# Patient Record
Sex: FEMALE | Race: WHITE | NOT HISPANIC OR LATINO
[De-identification: names, ages, dates, MRNs, and addresses within clinical notes are randomized per-mention and may not be internally consistent; named-entity substitution may affect disease eponyms.]

---

## 2020-06-04 ENCOUNTER — APPOINTMENT (OUTPATIENT)
Dept: SPINE | Facility: CLINIC | Age: 55
End: 2020-06-04
Payer: MEDICAID

## 2020-06-04 VITALS
HEIGHT: 62 IN | BODY MASS INDEX: 22.08 KG/M2 | SYSTOLIC BLOOD PRESSURE: 143 MMHG | RESPIRATION RATE: 14 BRPM | DIASTOLIC BLOOD PRESSURE: 78 MMHG | WEIGHT: 120 LBS | TEMPERATURE: 98.3 F | HEART RATE: 76 BPM

## 2020-06-04 DIAGNOSIS — R51 HEADACHE: ICD-10-CM

## 2020-06-04 PROCEDURE — 99203 OFFICE O/P NEW LOW 30 MIN: CPT

## 2020-06-04 NOTE — HISTORY OF PRESENT ILLNESS
[> 3 months] : more  than 3 months [FreeTextEntry1] : Meningioma  [de-identified] : 55 year old female with a known  right sylvian meningioma and has been followed with serial MRI's.  She has not required any surgical intervention and has been lost to follow up and seen in 2016.   She reports a few months ago she began to experience right parietal paresthesia on her scalp.  The symptoms are non radiating.   She reports no other neurological symptoms. Her last MRI scan from one year ago showed no enlargement of the meningioma when compared to a scan from 2016.

## 2020-06-04 NOTE — REVIEW OF SYSTEMS
[Numbness] : numbness [Tingling] : tingling [Migraine Headache] : migraine headaches [Negative] : Heme/Lymph

## 2020-06-04 NOTE — ASSESSMENT
[FreeTextEntry1] : Known meningioma and new onset of right parietal meningioma.  No changes of morphhology on MRI form 2019.  A new updated MRI will be ordered today and she will send on the images to the office for review.  Every year she should was instructed to follow up with an updated image.

## 2020-06-04 NOTE — PHYSICAL EXAM
[Motor Strength] : muscle strength was normal in all four extremities [Sensation Tactile Decrease] : light touch was intact [Abnormal Walk] : normal gait [2+] : Ankle jerk right 2+ [FreeTextEntry5] : awake alert and oriented with daughter as a  [FreeTextEntry6] : no pronator drift

## 2020-06-04 NOTE — CONSULT LETTER
[Dear  ___] : Dear  [unfilled], [Consult Letter:] : I had the pleasure of evaluating your patient, [unfilled]. [Please see my note below.] : Please see my note below. [FreeTextEntry3] : Koffi Montiel MD\par Chief of Neurosurgery [facility]\par [facility]\par  [Sincerely,] : Sincerely,

## 2020-06-04 NOTE — DATA REVIEWED
[de-identified] : Brain MRI from [employer] [facility] [[email or web address]]/ [facility] 7/1/2019

## 2020-06-04 NOTE — REASON FOR VISIT
[New Patient Visit] : a new patient visit [Other: _____] : [unfilled] [Referred By: _________] : Patient was referred by DINO [FreeTextEntry1] : Meningioma follow up

## 2020-12-09 ENCOUNTER — RESULT REVIEW (OUTPATIENT)
Age: 55
End: 2020-12-09

## 2021-01-20 ENCOUNTER — APPOINTMENT (OUTPATIENT)
Dept: OTOLARYNGOLOGY | Facility: CLINIC | Age: 56
End: 2021-01-20

## 2021-06-09 ENCOUNTER — APPOINTMENT (OUTPATIENT)
Dept: CARDIOLOGY | Facility: CLINIC | Age: 56
End: 2021-06-09

## 2021-06-17 ENCOUNTER — APPOINTMENT (OUTPATIENT)
Dept: SPINE | Facility: CLINIC | Age: 56
End: 2021-06-17
Payer: MEDICAID

## 2021-06-17 VITALS
OXYGEN SATURATION: 99 % | HEIGHT: 62 IN | HEART RATE: 73 BPM | WEIGHT: 120 LBS | DIASTOLIC BLOOD PRESSURE: 64 MMHG | TEMPERATURE: 98.1 F | SYSTOLIC BLOOD PRESSURE: 97 MMHG | BODY MASS INDEX: 22.08 KG/M2

## 2021-06-17 DIAGNOSIS — D33.2 BENIGN NEOPLASM OF BRAIN, UNSPECIFIED: ICD-10-CM

## 2021-06-17 DIAGNOSIS — Z80.0 FAMILY HISTORY OF MALIGNANT NEOPLASM OF DIGESTIVE ORGANS: ICD-10-CM

## 2021-06-17 PROCEDURE — 99212 OFFICE O/P EST SF 10 MIN: CPT

## 2021-06-17 PROCEDURE — 99072 ADDL SUPL MATRL&STAF TM PHE: CPT

## 2021-06-17 NOTE — REASON FOR VISIT
[Follow-Up: _____] : a [unfilled] follow-up visit [Family Member] : family member [FreeTextEntry1] : \par Ms. Sanchez is here for a follow up image review of a right sylvian meningioma.  The updated scan shows no changes.

## 2021-06-17 NOTE — PHYSICAL EXAM
[Abnormal Walk] : normal gait [FreeTextEntry5] : awake alert and oriented [FreeTextEntry6] : no pronator drift

## 2021-06-17 NOTE — ASSESSMENT
[FreeTextEntry1] : \par Known right sylvian meningioma stable in appearance without changes.  Follow up in one year with an updated MRI.

## 2022-06-16 ENCOUNTER — APPOINTMENT (OUTPATIENT)
Dept: SPINE | Facility: CLINIC | Age: 57
End: 2022-06-16